# Patient Record
Sex: MALE | Race: WHITE | NOT HISPANIC OR LATINO | Employment: FULL TIME | URBAN - METROPOLITAN AREA
[De-identification: names, ages, dates, MRNs, and addresses within clinical notes are randomized per-mention and may not be internally consistent; named-entity substitution may affect disease eponyms.]

---

## 2017-02-17 ENCOUNTER — OFFICE VISIT (OUTPATIENT)
Dept: FAMILY MEDICINE | Facility: CLINIC | Age: 47
End: 2017-02-17
Payer: COMMERCIAL

## 2017-02-17 VITALS
WEIGHT: 231.5 LBS | DIASTOLIC BLOOD PRESSURE: 90 MMHG | SYSTOLIC BLOOD PRESSURE: 130 MMHG | OXYGEN SATURATION: 97 % | BODY MASS INDEX: 33.14 KG/M2 | HEIGHT: 70 IN | HEART RATE: 68 BPM

## 2017-02-17 DIAGNOSIS — R22.1 NECK MASS: Primary | ICD-10-CM

## 2017-02-17 PROCEDURE — 99213 OFFICE O/P EST LOW 20 MIN: CPT | Mod: S$GLB,,, | Performed by: FAMILY MEDICINE

## 2017-02-17 PROCEDURE — 1160F RVW MEDS BY RX/DR IN RCRD: CPT | Mod: S$GLB,,, | Performed by: FAMILY MEDICINE

## 2017-02-17 PROCEDURE — 99999 PR PBB SHADOW E&M-EST. PATIENT-LVL III: CPT | Mod: PBBFAC,,, | Performed by: FAMILY MEDICINE

## 2017-02-17 RX ORDER — PROMETHAZINE HYDROCHLORIDE AND CODEINE PHOSPHATE 6.25; 1 MG/5ML; MG/5ML
SOLUTION ORAL
Refills: 0 | COMMUNITY
Start: 2017-01-04 | End: 2018-02-20

## 2017-02-17 NOTE — PROGRESS NOTES
"Subjective:       Patient ID: Uday Olea is a 47 y.o. male.    Chief Complaint: Lump on side of Neck (left)    HPI Comments: Patient presents for pain on his neck. He states when he eats sour foods it feels tender. He states he noticed this around a few weeks ago. It hasn't gotten any bigger. He denies redness or drainage. He has no cold symptoms currently, but hasd some a week ago of sinus drip, rhinorrhea. He has had no fever or chills.     Review of Systems   Constitutional: Negative for activity change, appetite change, chills, fatigue and fever.   HENT: Positive for postnasal drip and rhinorrhea. Negative for congestion, sneezing and sore throat.    Respiratory: Negative for cough and shortness of breath.    Cardiovascular: Negative for chest pain.       Objective:       Vitals:    02/17/17 0906   BP: (!) 130/90   Pulse: 68   SpO2: 97%   Weight: 105 kg (231 lb 7.7 oz)   Height: 5' 10" (1.778 m)       Physical Exam   Constitutional: He is oriented to person, place, and time. He appears well-developed and well-nourished. No distress.   HENT:   Head: Normocephalic and atraumatic.   Neck: Normal range of motion. Neck supple.       Musculoskeletal: He exhibits no edema.   Neurological: He is alert and oriented to person, place, and time.   Skin: He is not diaphoretic.   Psychiatric: He has a normal mood and affect.       Assessment:       1. Neck mass        Plan:       Uday was seen today for lump on side of neck (left).    Diagnoses and all orders for this visit:    Neck mass  -     CT Soft Tissue Neck W WO Contrast; Future  -     CT Soft Tissue Neck W WO Contrast    will order ct of the neck at and outside facility to see the nature of this lymph node due to the size of the node.    CT RESULTS RECEIVED AND NOTED TO BE A NORMAL NODE.        "

## 2017-02-24 ENCOUNTER — TELEPHONE (OUTPATIENT)
Dept: FAMILY MEDICINE | Facility: CLINIC | Age: 47
End: 2017-02-24

## 2017-02-24 NOTE — TELEPHONE ENCOUNTER
RECEIVED CT OF NECK AND SHOWED AN ENLARGED LYMPH NODE THAT DID NOT LOOK PATHOLOGIC AND APPEARED BENIGN=NOT CANCER

## 2018-02-20 ENCOUNTER — OFFICE VISIT (OUTPATIENT)
Dept: FAMILY MEDICINE | Facility: CLINIC | Age: 48
End: 2018-02-20
Payer: COMMERCIAL

## 2018-02-20 VITALS
HEIGHT: 70 IN | OXYGEN SATURATION: 96 % | SYSTOLIC BLOOD PRESSURE: 130 MMHG | WEIGHT: 229.5 LBS | TEMPERATURE: 98 F | HEART RATE: 70 BPM | BODY MASS INDEX: 32.86 KG/M2 | DIASTOLIC BLOOD PRESSURE: 100 MMHG

## 2018-02-20 DIAGNOSIS — Z00.00 ANNUAL PHYSICAL EXAM: Primary | ICD-10-CM

## 2018-02-20 DIAGNOSIS — I10 ESSENTIAL HYPERTENSION: ICD-10-CM

## 2018-02-20 DIAGNOSIS — K21.9 GASTROESOPHAGEAL REFLUX DISEASE WITHOUT ESOPHAGITIS: ICD-10-CM

## 2018-02-20 PROCEDURE — 99396 PREV VISIT EST AGE 40-64: CPT | Mod: S$GLB,,, | Performed by: FAMILY MEDICINE

## 2018-02-20 PROCEDURE — 99999 PR PBB SHADOW E&M-EST. PATIENT-LVL III: CPT | Mod: PBBFAC,,, | Performed by: FAMILY MEDICINE

## 2018-02-20 RX ORDER — OMEPRAZOLE 40 MG/1
40 CAPSULE, DELAYED RELEASE ORAL DAILY
Qty: 90 CAPSULE | Refills: 3 | Status: SHIPPED | OUTPATIENT
Start: 2018-02-20 | End: 2018-02-26 | Stop reason: SDUPTHER

## 2018-02-20 RX ORDER — DEXAMETHASONE 0.75 MG/1
TABLET ORAL
COMMUNITY
Start: 2018-02-10 | End: 2018-02-20

## 2018-02-20 RX ORDER — HYDROCHLOROTHIAZIDE 12.5 MG/1
12.5 TABLET ORAL DAILY
Qty: 90 TABLET | Refills: 1 | Status: SHIPPED | OUTPATIENT
Start: 2018-02-20 | End: 2018-02-26 | Stop reason: SDUPTHER

## 2018-02-20 RX ORDER — HYDROCHLOROTHIAZIDE 12.5 MG/1
12.5 TABLET ORAL DAILY
Qty: 30 TABLET | Refills: 0 | Status: SHIPPED | OUTPATIENT
Start: 2018-02-20 | End: 2018-02-20 | Stop reason: SDUPTHER

## 2018-02-20 NOTE — PROGRESS NOTES
"Subjective:       Patient ID: Uday Olea is a 48 y.o. male.    Chief Complaint: Follow-up and Medication Refill    Patient is here for annual exam. He also needs refills of his omeprazole. His blood pressure is elevated today, but is stressed due to recently being laid off. He and his wife have started exercising. He is on omeprazole for his reflux. He doesn't take this all the time. He states he ran out and took 20 mg OTC Prilosec, but was having breakthrough symptoms. He has symptoms with red sauces, spicy foods, or with eating lat.       Review of Systems   Constitutional: Negative for activity change and unexpected weight change.   HENT: Negative for hearing loss, rhinorrhea and trouble swallowing.    Eyes: Negative for discharge and visual disturbance.   Respiratory: Negative for chest tightness, shortness of breath and wheezing.    Cardiovascular: Negative for chest pain, palpitations and leg swelling.   Gastrointestinal: Positive for blood in stool. Negative for abdominal pain, constipation, diarrhea and vomiting.        +reflux   Endocrine: Negative for polydipsia and polyuria.   Genitourinary: Negative for difficulty urinating, hematuria and urgency.   Musculoskeletal: Negative for arthralgias, joint swelling and neck pain.   Neurological: Positive for headaches. Negative for dizziness, syncope, weakness and light-headedness.   Psychiatric/Behavioral: Negative for confusion and dysphoric mood.       Objective:       Vitals:    02/20/18 1446   BP: (!) 130/100   Pulse: 70   Temp: 98.1 °F (36.7 °C)   TempSrc: Oral   SpO2: 96%   Weight: 104.1 kg (229 lb 8 oz)   Height: 5' 10" (1.778 m)       Physical Exam   Constitutional: He appears well-developed and well-nourished. No distress.   HENT:   Head: Normocephalic.   Right Ear: External ear normal.   Left Ear: External ear normal.   Mouth/Throat: Oropharynx is clear and moist. No oropharyngeal exudate.   Eyes: Conjunctivae are normal.   Neck: Normal range of " motion. Neck supple. No thyromegaly present.   Cardiovascular: Normal rate, regular rhythm and normal heart sounds.  Exam reveals no gallop and no friction rub.    No murmur heard.  Pulmonary/Chest: Effort normal and breath sounds normal. No respiratory distress. He has no wheezes. He has no rales.   Abdominal: Soft. Bowel sounds are normal. He exhibits no distension and no mass. There is no tenderness. There is no rebound and no guarding.   Musculoskeletal: He exhibits no edema.   Lymphadenopathy:     He has no cervical adenopathy.   Neurological: He is alert.   Skin: No rash noted. He is not diaphoretic.   Psychiatric: He has a normal mood and affect.       Assessment:       1. Annual physical exam    2. Gastroesophageal reflux disease without esophagitis    3. Essential hypertension        Plan:       Uday was seen today for follow-up and medication refill.    Diagnoses and all orders for this visit:    Annual physical exam  -     CBC auto differential; Future  -     Lipid panel; Future  -     Comprehensive metabolic panel; Future    Gastroesophageal reflux disease without esophagitis  -     omeprazole (PRILOSEC) 40 MG capsule; Take 1 capsule (40 mg total) by mouth once daily.  Stable. Refilled meds.     Essential hypertension  -     hydroCHLOROthiazide (HYDRODIURIL) 12.5 MG Tab; Take 1 tablet (12.5 mg total) by mouth once daily.

## 2018-02-26 DIAGNOSIS — K21.9 GASTROESOPHAGEAL REFLUX DISEASE WITHOUT ESOPHAGITIS: ICD-10-CM

## 2018-02-26 NOTE — TELEPHONE ENCOUNTER
----- Message from Brenda Rubin sent at 2/26/2018 10:45 AM CST -----  Contact: Self   Patient says he gave you the wrong pharmacy. Please send to the correct Pharmacy. Patient can be reached at  [phone].        hydroCHLOROthiazide (HYDRODIURIL) 12.5 MG Tab  omeprazole (PRILOSEC) 40 MG capsule    [facility]

## 2018-02-27 ENCOUNTER — LAB VISIT (OUTPATIENT)
Dept: LAB | Facility: HOSPITAL | Age: 48
End: 2018-02-27
Attending: FAMILY MEDICINE
Payer: COMMERCIAL

## 2018-02-27 DIAGNOSIS — Z00.00 ANNUAL PHYSICAL EXAM: ICD-10-CM

## 2018-02-27 LAB
ALBUMIN SERPL BCP-MCNC: 3.8 G/DL
ALP SERPL-CCNC: 51 U/L
ALT SERPL W/O P-5'-P-CCNC: 30 U/L
ANION GAP SERPL CALC-SCNC: 8 MMOL/L
AST SERPL-CCNC: 22 U/L
BASOPHILS # BLD AUTO: 0.05 K/UL
BASOPHILS NFR BLD: 0.9 %
BILIRUB SERPL-MCNC: 0.8 MG/DL
BUN SERPL-MCNC: 15 MG/DL
CALCIUM SERPL-MCNC: 9.4 MG/DL
CHLORIDE SERPL-SCNC: 106 MMOL/L
CHOLEST SERPL-MCNC: 148 MG/DL
CHOLEST/HDLC SERPL: 4 {RATIO}
CO2 SERPL-SCNC: 27 MMOL/L
CREAT SERPL-MCNC: 1.1 MG/DL
DIFFERENTIAL METHOD: NORMAL
EOSINOPHIL # BLD AUTO: 0.2 K/UL
EOSINOPHIL NFR BLD: 3.6 %
ERYTHROCYTE [DISTWIDTH] IN BLOOD BY AUTOMATED COUNT: 12.6 %
EST. GFR  (AFRICAN AMERICAN): >60 ML/MIN/1.73 M^2
EST. GFR  (NON AFRICAN AMERICAN): >60 ML/MIN/1.73 M^2
GLUCOSE SERPL-MCNC: 93 MG/DL
HCT VFR BLD AUTO: 45.4 %
HDLC SERPL-MCNC: 37 MG/DL
HDLC SERPL: 25 %
HGB BLD-MCNC: 15.1 G/DL
IMM GRANULOCYTES # BLD AUTO: 0.01 K/UL
IMM GRANULOCYTES NFR BLD AUTO: 0.2 %
LDLC SERPL CALC-MCNC: 80.2 MG/DL
LYMPHOCYTES # BLD AUTO: 1.8 K/UL
LYMPHOCYTES NFR BLD: 31.8 %
MCH RBC QN AUTO: 28.9 PG
MCHC RBC AUTO-ENTMCNC: 33.3 G/DL
MCV RBC AUTO: 87 FL
MONOCYTES # BLD AUTO: 0.6 K/UL
MONOCYTES NFR BLD: 11 %
NEUTROPHILS # BLD AUTO: 2.9 K/UL
NEUTROPHILS NFR BLD: 52.5 %
NONHDLC SERPL-MCNC: 111 MG/DL
NRBC BLD-RTO: 0 /100 WBC
PLATELET # BLD AUTO: 276 K/UL
PMV BLD AUTO: 9.7 FL
POTASSIUM SERPL-SCNC: 3.9 MMOL/L
PROT SERPL-MCNC: 7 G/DL
RBC # BLD AUTO: 5.22 M/UL
SODIUM SERPL-SCNC: 141 MMOL/L
TRIGL SERPL-MCNC: 154 MG/DL
WBC # BLD AUTO: 5.53 K/UL

## 2018-02-27 PROCEDURE — 36415 COLL VENOUS BLD VENIPUNCTURE: CPT | Mod: PO

## 2018-02-27 PROCEDURE — 80053 COMPREHEN METABOLIC PANEL: CPT

## 2018-02-27 PROCEDURE — 85025 COMPLETE CBC W/AUTO DIFF WBC: CPT

## 2018-02-27 PROCEDURE — 80061 LIPID PANEL: CPT

## 2018-02-27 RX ORDER — HYDROCHLOROTHIAZIDE 12.5 MG/1
12.5 TABLET ORAL DAILY
Qty: 90 TABLET | Refills: 1 | Status: SHIPPED | OUTPATIENT
Start: 2018-02-27 | End: 2019-02-27

## 2018-02-27 RX ORDER — OMEPRAZOLE 40 MG/1
40 CAPSULE, DELAYED RELEASE ORAL DAILY
Qty: 90 CAPSULE | Refills: 3 | Status: SHIPPED | OUTPATIENT
Start: 2018-02-27

## 2018-03-20 RX ORDER — HYDROCHLOROTHIAZIDE 12.5 MG/1
TABLET ORAL
Qty: 30 TABLET | Refills: 5 | Status: SHIPPED | OUTPATIENT
Start: 2018-03-20

## 2018-06-27 ENCOUNTER — TELEPHONE (OUTPATIENT)
Dept: FAMILY MEDICINE | Facility: CLINIC | Age: 48
End: 2018-06-27

## 2018-06-27 NOTE — TELEPHONE ENCOUNTER
----- Message from Thomas Quiñones sent at 6/27/2018  9:48 AM CDT -----  Contact: Wife-Riya   States pt was seen at [facility] this past weekend and wants to know if summary of visit has been faxed to clinic. Wife can be reached @ [phone].

## 2018-06-27 NOTE — TELEPHONE ENCOUNTER
Checked all faxes that has come through from Monday to now, and have not seen anything/ Will fax over release to [facility] to see if we can retreive it by the time of patient's office visit tomorrow

## 2018-06-28 ENCOUNTER — OFFICE VISIT (OUTPATIENT)
Dept: FAMILY MEDICINE | Facility: CLINIC | Age: 48
End: 2018-06-28
Payer: COMMERCIAL

## 2018-06-28 VITALS
BODY MASS INDEX: 32.79 KG/M2 | HEART RATE: 64 BPM | WEIGHT: 229.06 LBS | SYSTOLIC BLOOD PRESSURE: 138 MMHG | TEMPERATURE: 98 F | OXYGEN SATURATION: 97 % | DIASTOLIC BLOOD PRESSURE: 80 MMHG | HEIGHT: 70 IN

## 2018-06-28 DIAGNOSIS — K92.1 HEMATOCHEZIA: Primary | ICD-10-CM

## 2018-06-28 DIAGNOSIS — G62.9 NEUROPATHY: ICD-10-CM

## 2018-06-28 PROCEDURE — 3075F SYST BP GE 130 - 139MM HG: CPT | Mod: CPTII,S$GLB,, | Performed by: FAMILY MEDICINE

## 2018-06-28 PROCEDURE — 3008F BODY MASS INDEX DOCD: CPT | Mod: CPTII,S$GLB,, | Performed by: FAMILY MEDICINE

## 2018-06-28 PROCEDURE — 3079F DIAST BP 80-89 MM HG: CPT | Mod: CPTII,S$GLB,, | Performed by: FAMILY MEDICINE

## 2018-06-28 PROCEDURE — 99214 OFFICE O/P EST MOD 30 MIN: CPT | Mod: S$GLB,,, | Performed by: FAMILY MEDICINE

## 2018-06-28 PROCEDURE — 99999 PR PBB SHADOW E&M-EST. PATIENT-LVL III: CPT | Mod: PBBFAC,,, | Performed by: FAMILY MEDICINE

## 2018-06-28 NOTE — PROGRESS NOTES
Subjective:       Patient ID: Uday Olea is a 48 y.o. male.    Chief Complaint: ER follow Up (Numbness/Tingling in L arm. Went to  ER); Other (Feeling fine. Still having Tingling); and Hemorrhoids (blood in stool. Abd discomfort)    48 year old male with GERD presents with 2 issues:    He also has had some hematochezia. He had some increased bowel movements and has more blood with bowel movements in the toilet and on the paper.     2 week history of left arm numbness and tingling that improved with movement.He denies weakness in the extremity. He states it improved with moving his shoulder and is also brought on by changing positions. He states he was doing yard work and the numbness came back. He went to the ED at [place]. He had an echo, carotid ultrasound, MRI, and CT scan done. He was kept and all the results were normal, except the ECHO, which had not been received yet. He has his test results , which shows spurring in his cervical spine xray. His MRI AND CT OF THE BRAIN WERE NORMAL. ECHO showed mild LV hypertrophy and mild aortic dilatation. He had a normal CT of his neck.    He recently changed bed as it was so soft that he couldn't get out of bed.       Review of Systems   Constitutional: Negative for activity change and unexpected weight change.   HENT: Negative for hearing loss, rhinorrhea and trouble swallowing.    Eyes: Negative for discharge and visual disturbance.   Respiratory: Negative for chest tightness and wheezing.    Cardiovascular: Negative for chest pain and palpitations.   Gastrointestinal: Positive for blood in stool. Negative for constipation, diarrhea and vomiting.   Endocrine: Negative for polydipsia and polyuria.   Genitourinary: Negative for difficulty urinating, hematuria and urgency.   Musculoskeletal: Negative for arthralgias, joint swelling and neck pain.   Neurological: Negative for weakness and headaches.   Psychiatric/Behavioral: Negative for confusion and  "dysphoric mood.       Objective:       Vitals:    06/28/18 0807   BP: 138/80   Pulse: 64   Temp: 97.9 °F (36.6 °C)   TempSrc: Oral   SpO2: 97%   Weight: 103.9 kg (229 lb 0.9 oz)   Height: 5' 10" (1.778 m)       Physical Exam   Constitutional: He is oriented to person, place, and time. He appears well-developed and well-nourished. No distress.   HENT:   Head: Normocephalic and atraumatic.   Eyes: Conjunctivae are normal.   Neck: Normal range of motion. Neck supple. Carotid bruit is not present.   Cardiovascular: Normal rate, regular rhythm and normal heart sounds.  Exam reveals no gallop and no friction rub.    No murmur heard.  Pulmonary/Chest: Effort normal and breath sounds normal. No respiratory distress. He has no wheezes. He has no rales.   Musculoskeletal: He exhibits no edema.        Left shoulder: He exhibits normal range of motion, no tenderness, no bony tenderness, no deformity, no laceration, no pain and normal strength.        Cervical back: He exhibits tenderness and pain. He exhibits normal range of motion, no bony tenderness and no spasm.   Lymphadenopathy:     He has no cervical adenopathy.   Neurological: He is alert and oriented to person, place, and time.   Skin: He is not diaphoretic.   Psychiatric: He has a normal mood and affect.       Assessment:       1. Hematochezia    2. Neuropathy        Plan:       Uday was seen today for er follow up, other and hemorrhoids.    Diagnoses and all orders for this visit:    Hematochezia  -     Ambulatory referral to Gastroenterology  GI referral for colonoscopy    Neuropathy  -     Ambulatory Referral to Physical/Occupational Therapy  Referral for PT given. He will also consider seeing a chiropractor.            "

## 2018-07-03 ENCOUNTER — TELEPHONE (OUTPATIENT)
Dept: FAMILY MEDICINE | Facility: CLINIC | Age: 48
End: 2018-07-03

## 2018-07-03 NOTE — TELEPHONE ENCOUNTER
I spoke with the patient regarding a referral to Gastroenterology, patient will be scheduling with [facility].

## 2018-07-18 ENCOUNTER — CLINICAL SUPPORT (OUTPATIENT)
Dept: REHABILITATION | Facility: HOSPITAL | Age: 48
End: 2018-07-18
Attending: FAMILY MEDICINE
Payer: COMMERCIAL

## 2018-07-18 DIAGNOSIS — R29.898 DECREASED RANGE OF MOTION OF NECK: ICD-10-CM

## 2018-07-18 DIAGNOSIS — M99.01 CERVICAL SEGMENT DYSFUNCTION: ICD-10-CM

## 2018-07-18 DIAGNOSIS — R29.898 TIGHTNESS OF NECK: ICD-10-CM

## 2018-07-18 DIAGNOSIS — G62.9 NEUROPATHY: Primary | ICD-10-CM

## 2018-07-18 PROCEDURE — 97161 PT EVAL LOW COMPLEX 20 MIN: CPT | Mod: PN | Performed by: PHYSICAL THERAPIST

## 2018-07-18 NOTE — PROGRESS NOTES
Physical Therapy Initial Evaluation     Name: Uday Olea  [facility] Number: [id number]    Diagnosis:   Encounter Diagnoses   Name Primary?    Neuropathy Yes    Decreased range of motion of neck     Tightness of neck     Cervical segment dysfunction      Physician: Jigna Draper MD  Treatment Orders: PT Eval and Treat  No past medical history on file.  Current Outpatient Prescriptions   Medication Sig    hydroCHLOROthiazide (HYDRODIURIL) 12.5 MG Tab Take 1 tablet (12.5 mg total) by mouth once daily.    hydroCHLOROthiazide (HYDRODIURIL) 12.5 MG Tab TAKE 1 TABLET(12.5 MG) BY MOUTH EVERY DAY    omeprazole (PRILOSEC) 40 MG capsule Take 1 capsule (40 mg total) by mouth once daily.     No current facility-administered medications for this visit.      Review of patient's allergies indicates:  No Known Allergies      Start Time:  1115  Stop Time:  1215  Total Timed Minutes:  60          Subjective       Patient states:  Tingling / numbness LUE past elbow and at times into the hand, intermittent.  No loss of strength.  No pain described.   Onset: three months ago insidious onset while sitting on a bar stool working on and I-pad at a bar (high table). Began to notice tingling in the left arm from the shoulder to the elbow. This resolved but began to repeat itself on an intermittent basis. Three weeks ago working in the yard episode recurred but did not resolve. To ER for work-up. Cardiac testing - negative. X-Rays - positive for cervical spine osteophytes.   Radicular symptoms:  Yes LUE   Aggravating factors:   Arm positioned out to the side, elevated.  Easing factors:  Self-limits   Sleep - not disturbed  Dizziness, blurred vision, tinnitus - negative   Headaches - denies  Nausea - denies  Difficulty swallowing - denies     Pain Scale: Patient rates pain on a scale of 0-10 to be = THIS IS NOT DESCRIBED AS PAIN< only PARESTHESIA LUE   2 currently   3 at worst ;    0 at best .    Prior Therapy:  No     Functional Deficits Leading to Referral: there are not any functional limitations described   Personal - no limitations    Domestic - no limitations   Community / Social - no limitations   Occupation - NA   Prior functional status: no limitation     Occupation:  Not employed at this time                     Pts goals:  To eliminate and reduce the numbness.   Past History:  Negative for past history of neck or shoulder problems. Denies other musculoskeletal conditions for which he has been evaluated.   Imaging : none obtained    Objective     Posture Alignment: slight forward head, rounded shoulders:  prominent AC joint grade 3   Palpation: negative for cervical PSM spasm or guarding, pain in the left scalene with  MTrP in the left suprascapular area.  Sensation: Light Touch: Intact    Range of Motion/Strength:     Cervical/Thoracic AROM: Pain/Dysfunction with Movement:   Flexion                               70 DN   Extension                           65 DP   Right side bending                     40 No pain    Left side bending                        50 No pain   Right rotation DN   Left rotation DN     SEGMENTAL MOBILITY: pain elicited over low cervical spinous processes C5, C6    UPPER EXTREMITY STRENGTH:   Left  5/5 Right  5/5/         DTR's:   Left Right   Biceps Tendon 1+ 2+   Brachioradialis 1+ 1+   Triceps Tendon 2+ 2+       Special Tests:   Left Right   Compression Neg  Neg    Dystraction Neg  Neg    Spurling Pos   Neg          Selective Functional Movement Assessment:  FN: functional, non-painful  FP: functional, painful  DP: dysfunctional, painful  DN: dysfunctional, non-painful    Active Cervical Flexion: see above   Active Cervical Extension: see above   Cervical Rotation:  Right:see above   Left: see above   Upper extremity pattern 1:  Right: FN  Left:FN  Upper extremity pattern 2:  Right:FN   Left:FN    Cervical Break out patterns   Supine   Flex   -Active -  FN     Rotation   -Active -   R - FN  L - FN      OA   -active  R - FN   L - FN     C1C2  -Active-     R - FN  L - FN       PT Evaluation Completed? Yes  Discussed Plan of Care with patient: Yes    Pt/family was provided educational information, including: role of PT, goals for PT, scheduling - pt verbalized understanding.       Assessment       Initial Assessment (Pertinent finding, problem list and factors affecting outcome): The patient is referred with left UE paresthesia and no c/o cervical pain. Clinical findings demonstrate a stability and motor control dysfunction of the cervical flexors and  and a possible cervical spine joint mobility dysfunction and tissue extensibility dysfunction in extension. No neurological deficits ae identified.  Pt presents with signs and symptoms consistent with referring diagnosis. Evaluation has determined a decrease in functional status and subjective and objective deficits that can be addressed by physical therapy intervention. Pt does not demonstrate pain limiting functional activities. Decreased flexibility and strength limiting normal movement patterns. Possible decreased segmental motion. Decreased postural strength and awareness. Positive special testing.  Subjective and objective measures are addressed by goals in the plan of care. Patient/family are involved in the development of these goals. Uday Crow Lefty should benefit from therapeutic intervention to treat the symptoms and achieve established goals.Realistic expectations and hopeful outcomes were discussed. The patient demonstrated and verbalized an understanding of the program and goals as well as expectations. Patient has no barriers to learning. Patient verbalizes understanding of her program and goals. No cultural, spiritual, or educational needs identified.        History  Co-morbidities and personal factors that may impact the plan of care Examination  Body Structures and Functions, activity limitations and  participation restrictions that may impact the plan of care Clinical Presentation   Decision Making/ Complexity Score   Co-morbidities:  No limitations .              Personal Factors:   None identified  Body Regions: neck    Body Systems: musculoskeletal          Activity limitations: Arm positioned out to the side, elevated      Participation Restrictions:   Functional Deficits Leading to Referral: there are not any functional limitations described   Personal - no limitations    Domestic - no limitations   Community / Social - no limitations   Occupation - NA   Prior functional status: no limitation          Stable and uncomplicated          Pain  THIS IS NOT DESCRIBED AS PAIN< only PARESTHESIA LUE   2 currently   3 at worst ;   0 at best .   Complexity: Low                   Short Term GOALS: 6 weeks. Pt agrees with goals set.  1. Decrease paresthesia episodes  20-40%  2. Increase mobility in the low cervical segments in extension   3. Reduce tightness in the anterior chest and suprascapular musculature   4. Patient demonstrates correct exercise technique and return demonstration for progression to [facility]      Long Term GOALS: 12 weeks. Pt agrees with goals set.  1. Patient demonstrates increased scapulothoracic strength and endurance to improve tolerance to functional activities.   2.Patient demonstrates independence with Postural Awareness and correction   3. Patient demonstrates independence with body mechanics.   4. Restore normal cervical mobility and eliminate paresthesia LUE     CMS Impairment/Limitation/Restriction for FOTO Lumbar Spine Survey  Status Limitation G-Code CMS Severity Modifier  Intake 94% 6% Current Status CI - At least 1 percent but less than 20 percent  Predicted 95% 5% Goal Status+ CI - At least 1 percent but less than 20 percent  D/C Status CI **only report if this is a one time visit  +INTAKE FUNCTIONAL STATUS SUMMARY (7/18/2018)  Patient: ALEX CRAWLEY ([id number]) Primary Body Part:  Lumbar Spine Initial DOS: 7/18/2018  Produced and © [id number] by  [employer]    Plan       Certification Period: 6/18/2018 to 9/18/2018  Recommended Treatment Plan: 2 times per week for 12 weeks: Manual Therapy, Patient Education and Therapeutic Exercise  Other Recommendations:    Pt may be seen by PTA as part of the rehabilitation team.         Therapist: Jose Miguel Luis PT

## 2018-07-19 PROBLEM — G62.9 NEUROPATHY: Status: ACTIVE | Noted: 2018-07-19

## 2018-07-19 PROBLEM — M99.01 CERVICAL SEGMENT DYSFUNCTION: Status: ACTIVE | Noted: 2018-07-19

## 2018-07-19 PROBLEM — R29.898 DECREASED RANGE OF MOTION OF NECK: Status: ACTIVE | Noted: 2018-07-19

## 2018-07-19 PROBLEM — R29.898 TIGHTNESS OF NECK: Status: ACTIVE | Noted: 2018-07-19

## 2018-07-19 NOTE — PLAN OF CARE
Physical Therapy Initial Evaluation     Name: Uday Olea  [facility] Number: [id number]    Diagnosis:   Encounter Diagnoses   Name Primary?    Neuropathy Yes    Decreased range of motion of neck     Tightness of neck     Cervical segment dysfunction      Physician: Jigna Draper MD  Treatment Orders: PT Eval and Treat  No past medical history on file.  Current Outpatient Prescriptions   Medication Sig    hydroCHLOROthiazide (HYDRODIURIL) 12.5 MG Tab Take 1 tablet (12.5 mg total) by mouth once daily.    hydroCHLOROthiazide (HYDRODIURIL) 12.5 MG Tab TAKE 1 TABLET(12.5 MG) BY MOUTH EVERY DAY    omeprazole (PRILOSEC) 40 MG capsule Take 1 capsule (40 mg total) by mouth once daily.     No current facility-administered medications for this visit.      Review of patient's allergies indicates:  No Known Allergies      Start Time:  1115  Stop Time:  1215  Total Timed Minutes:  60          Subjective       Patient states:  Tingling / numbness LUE past elbow and at times into the hand, intermittent.  No loss of strength.  No pain described.   Onset: three months ago insidious onset while sitting on a bar stool working on and I-pad at a bar (high table). Began to notice tingling in the left arm from the shoulder to the elbow. This resolved but began to repeat itself on an intermittent basis. Three weeks ago working in the yard episode recurred but did not resolve. To ER for work-up. Cardiac testing - negative. X-Rays - positive for cervical spine osteophytes.   Radicular symptoms:  Yes LUE   Aggravating factors:   Arm positioned out to the side, elevated.  Easing factors:  Self-limits   Sleep - not disturbed  Dizziness, blurred vision, tinnitus - negative   Headaches - denies  Nausea - denies  Difficulty swallowing - denies     Pain Scale: Patient rates pain on a scale of 0-10 to be = THIS IS NOT DESCRIBED AS PAIN< only PARESTHESIA LUE   2 currently   3 at worst ;    0 at best .    Prior Therapy:  No     Functional Deficits Leading to Referral: there are not any functional limitations described   Personal - no limitations    Domestic - no limitations   Community / Social - no limitations   Occupation - NA   Prior functional status: no limitation     Occupation:  Not employed at this time                     Pts goals:  To eliminate and reduce the numbness.   Past History:  Negative for past history of neck or shoulder problems. Denies other musculoskeletal conditions for which he has been evaluated.   Imaging : none obtained    Objective     Posture Alignment: slight forward head, rounded shoulders:  prominent AC joint grade 3   Palpation: negative for cervical PSM spasm or guarding, pain in the left scalene with  MTrP in the left suprascapular area.  Sensation: Light Touch: Intact    Range of Motion/Strength:     Cervical/Thoracic AROM: Pain/Dysfunction with Movement:   Flexion                               70 DN   Extension                           65 DP   Right side bending                     40 No pain    Left side bending                        50 No pain   Right rotation DN   Left rotation DN     SEGMENTAL MOBILITY: pain elicited over low cervical spinous processes C5, C6    UPPER EXTREMITY STRENGTH:   Left  5/5 Right  5/5/         DTR's:   Left Right   Biceps Tendon 1+ 2+   Brachioradialis 1+ 1+   Triceps Tendon 2+ 2+       Special Tests:   Left Right   Compression Neg  Neg    Dystraction Neg  Neg    Spurling Pos   Neg          Selective Functional Movement Assessment:  FN: functional, non-painful  FP: functional, painful  DP: dysfunctional, painful  DN: dysfunctional, non-painful    Active Cervical Flexion: see above   Active Cervical Extension: see above   Cervical Rotation:  Right:see above   Left: see above   Upper extremity pattern 1:  Right: FN  Left:FN  Upper extremity pattern 2:  Right:FN   Left:FN    Cervical Break out patterns   Supine   Flex   -Active -  FN     Rotation   -Active -   R - FN  L - FN      OA   -active  R - FN   L - FN     C1C2  -Active-     R - FN  L - FN       PT Evaluation Completed? Yes  Discussed Plan of Care with patient: Yes    Pt/family was provided educational information, including: role of PT, goals for PT, scheduling - pt verbalized understanding.       Assessment       Initial Assessment (Pertinent finding, problem list and factors affecting outcome): The patient is referred with left UE paresthesia and no c/o cervical pain. Clinical findings demonstrate a stability and motor control dysfunction of the cervical flexors and  and a possible cervical spine joint mobility dysfunction and tissue extensibility dysfunction in extension. No neurological deficits ae identified.  Pt presents with signs and symptoms consistent with referring diagnosis. Evaluation has determined a decrease in functional status and subjective and objective deficits that can be addressed by physical therapy intervention. Pt does not demonstrate pain limiting functional activities. Decreased flexibility and strength limiting normal movement patterns. Possible decreased segmental motion. Decreased postural strength and awareness. Positive special testing.  Subjective and objective measures are addressed by goals in the plan of care. Patient/family are involved in the development of these goals. Uday Crow Lefty should benefit from therapeutic intervention to treat the symptoms and achieve established goals.Realistic expectations and hopeful outcomes were discussed. The patient demonstrated and verbalized an understanding of the program and goals as well as expectations. Patient has no barriers to learning. Patient verbalizes understanding of her program and goals. No cultural, spiritual, or educational needs identified.        History  Co-morbidities and personal factors that may impact the plan of care Examination  Body Structures and Functions, activity limitations and  participation restrictions that may impact the plan of care Clinical Presentation   Decision Making/ Complexity Score   Co-morbidities:  No limitations .              Personal Factors:   None identified  Body Regions: neck    Body Systems: musculoskeletal          Activity limitations: Arm positioned out to the side, elevated      Participation Restrictions:   Functional Deficits Leading to Referral: there are not any functional limitations described   Personal - no limitations    Domestic - no limitations   Community / Social - no limitations   Occupation - NA   Prior functional status: no limitation          Stable and uncomplicated          Pain  THIS IS NOT DESCRIBED AS PAIN< only PARESTHESIA LUE   2 currently   3 at worst ;   0 at best .   Complexity: Low                   Short Term GOALS: 6 weeks. Pt agrees with goals set.  1. Decrease paresthesia episodes  20-40%  2. Increase mobility in the low cervical segments in extension   3. Reduce tightness in the anterior chest and suprascapular musculature   4. Patient demonstrates correct exercise technique and return demonstration for progression to [facility]      Long Term GOALS: 12 weeks. Pt agrees with goals set.  1. Patient demonstrates increased scapulothoracic strength and endurance to improve tolerance to functional activities.   2.Patient demonstrates independence with Postural Awareness and correction   3. Patient demonstrates independence with body mechanics.   4. Restore normal cervical mobility and eliminate paresthesia LUE     CMS Impairment/Limitation/Restriction for FOTO Lumbar Spine Survey  Status Limitation G-Code CMS Severity Modifier  Intake 94% 6% Current Status CI - At least 1 percent but less than 20 percent  Predicted 95% 5% Goal Status+ CI - At least 1 percent but less than 20 percent  D/C Status CI **only report if this is a one time visit  +INTAKE FUNCTIONAL STATUS SUMMARY (7/18/2018)  Patient: ALEX CRAWLEY ([id number]) Primary Body Part:  Lumbar Spine Initial DOS: 7/18/2018  Produced and © [id number] by  [employer]    Plan       Certification Period: 6/18/2018 to 9/18/2018  Recommended Treatment Plan: 2 times per week for 12 weeks: Manual Therapy, Patient Education and Therapeutic Exercise  Other Recommendations:    Pt may be seen by PTA as part of the rehabilitation team.         Therapist: Jose Miguel Luis PT

## 2018-07-31 ENCOUNTER — CLINICAL SUPPORT (OUTPATIENT)
Dept: REHABILITATION | Facility: HOSPITAL | Age: 48
End: 2018-07-31
Attending: FAMILY MEDICINE
Payer: COMMERCIAL

## 2018-07-31 DIAGNOSIS — R29.898 TIGHTNESS OF NECK: ICD-10-CM

## 2018-07-31 DIAGNOSIS — R29.898 DECREASED RANGE OF MOTION OF NECK: ICD-10-CM

## 2018-07-31 DIAGNOSIS — G62.9 NEUROPATHY: Primary | ICD-10-CM

## 2018-07-31 DIAGNOSIS — M99.01 CERVICAL SEGMENT DYSFUNCTION: ICD-10-CM

## 2018-07-31 PROCEDURE — 97140 MANUAL THERAPY 1/> REGIONS: CPT | Mod: PN | Performed by: PHYSICAL THERAPIST

## 2018-07-31 PROCEDURE — 97110 THERAPEUTIC EXERCISES: CPT | Mod: PN | Performed by: PHYSICAL THERAPIST

## 2018-07-31 NOTE — PROGRESS NOTES
"                                          Physical Therapy Daily Note           Name: Uday Olea  Clinic Number: [id number]  Date of Treatment: 07/31/2018   Diagnosis:   Encounter Diagnoses   Name Primary?    Neuropathy Yes    Decreased range of motion of neck     Tightness of neck     Cervical segment dysfunction        Time in: 1405  Time Out: 1510  Total Treatment Time: 65    VISITS / GILL: 2/20 12/31/2018  BOLD=INDICATES ACTIVITIES PERFORMED.      Subjective  Patient states "he has no pain today. He has tingling down his arm which has decreased in frequency the past 2 weeks to where he barely notices it but does not know what has changed."  Pain level - 0/10     Objective    Treatment: Patient  was instructed in and performed therapeutic exercises to develop strength, ROM, flexibility and posture. Patient performed therapeutic exercises consisting of the following      Leg press   Recumbent bike  Upright bike   UE ergometer  Treadmill   Elliptical       THERAPEUTIC EXERCISE 55'   SUPINE  -Chin tucks x20  -rotation R/L w/end range OP x15   -lateral SB stretch 10" x 6   -TD, angels , scarecrow x10 patterns .   -cervical isometrics 7" x 10     SIDELYING  -lateral lift   -    PRONE  -neck exten x15    STANDING.  -hi row, tight row, w-pull 10 patterns     SITTING         MANUAL THERAPY: manual distraction 7' at the cervical spine with the Mulligan Belt.   MODALITY  DIRECT EDUCATION:        Assessment    Completed all activities as noted. He demonstrated good mobility and was not limited with any of the cervical stretching, ROM or isometric exercises. There was no pain exhibited. Should progress well.   Medical Necessity is demonstrated by:   difficulty  to participate in daily activities  Pain limits function of effected part for some activities, Requires skilled supervision to complete and progress treatment interventions and HEP.  Pt demo good understanding of the education provided. Patient demonstrated " good return demonstration of activities.Patient's tolerance to treatment was good. Patient will continue to benefit from skilled PTintervention.Patient is making progress towards established goals.  New/Revised goals: no  Continue with established Plan of Care towards PT goals.       PLAN     Certification Period: 6/18/2018 to 9/18/2018  Recommended Treatment Plan: 2 times per week for 12 weeks: Manual Therapy, Patient Education and Therapeutic Exercise  Other Recommendations:    Pt may be seen by PTA as part of the rehabilitation team.      Therapist: Jose Miguel Luis PT

## 2018-08-02 ENCOUNTER — CLINICAL SUPPORT (OUTPATIENT)
Dept: REHABILITATION | Facility: HOSPITAL | Age: 48
End: 2018-08-02
Attending: FAMILY MEDICINE
Payer: COMMERCIAL

## 2018-08-02 DIAGNOSIS — G62.9 NEUROPATHY: Primary | ICD-10-CM

## 2018-08-02 DIAGNOSIS — R29.898 DECREASED RANGE OF MOTION OF NECK: ICD-10-CM

## 2018-08-02 DIAGNOSIS — M99.01 CERVICAL SEGMENT DYSFUNCTION: ICD-10-CM

## 2018-08-02 DIAGNOSIS — R29.898 TIGHTNESS OF NECK: ICD-10-CM

## 2018-08-02 PROCEDURE — 97110 THERAPEUTIC EXERCISES: CPT | Mod: PN | Performed by: PHYSICAL THERAPIST

## 2018-08-02 NOTE — PROGRESS NOTES
"                                          Physical Therapy Daily Note           Name: Uday Olea  Clinic Number: [id number]  Date of Treatment: 08/02/2018   Diagnosis:   Encounter Diagnoses   Name Primary?    Neuropathy Yes    Decreased range of motion of neck     Tightness of neck     Cervical segment dysfunction        Time in: 905  Time Out:1015   Total Treatment Time: 70 minutes     VISITS / GILL: 3/20  12/31/2018  BOLD=INDICATES ACTIVITIES PERFORMED.      Subjective  Patient states "he is not experiencing any pain, numbness or tingling in the arm. There is some soreness in the neck following the last session.     Pain level - 0/10     Objective    Treatment: Patient  was instructed in and performed therapeutic exercises to develop strength, ROM, flexibility and posture. Patient performed therapeutic exercises consisting of the following      Leg press   Recumbent bike  Upright bike   UE ergometer 8'  Treadmill   Elliptical       THERAPEUTIC EXERCISE   SUPINE  -Chin tucks x20  -rotation R/L w/end range OP x15   -lateral SB stretch 10" x 6   -TD, angels , scarecrow x10 patterns .   -cervical isometrics 7" x 10   -thoracic stretch with arm raise / neck rotation   - rotation with overhead reach  SIDELYING  -lateral lift x15  -    PRONE  -neck exten x15    STANDING.  -hi row, tight row, w-pull 10 patterns     SITTING         MANUAL THERAPY: manual distraction 7' at the cervical spine with the Mulligan Belt.   MODALITY  DIRECT EDUCATION:        Assessment    All activities were performed. He did not demonstrate signs of pain. Good cervical mobility identified. Progressing with cervical strengthening.   Medical Necessity is demonstrated by:   difficulty  to participate in daily activities  Pain limits function of effected part for some activities, Requires skilled supervision to complete and progress treatment interventions and HEP.  Pt demo good understanding of the education provided. Patient demonstrated good " return demonstration of activities.Patient's tolerance to treatment was good. Patient will continue to benefit from skilled PTintervention.Patient is making progress towards established goals.  New/Revised goals: no  Continue with established Plan of Care towards PT goals.       PLAN     Certification Period: 6/18/2018 to 9/18/2018  Recommended Treatment Plan: 2 times per week for 12 weeks: Manual Therapy, Patient Education and Therapeutic Exercise  Other Recommendations:    Pt may be seen by PTA as part of the rehabilitation team.      Therapist: Jose Miguel Luis, PT

## 2018-08-06 ENCOUNTER — CLINICAL SUPPORT (OUTPATIENT)
Dept: REHABILITATION | Facility: HOSPITAL | Age: 48
End: 2018-08-06
Attending: FAMILY MEDICINE
Payer: COMMERCIAL

## 2018-08-06 DIAGNOSIS — R29.898 TIGHTNESS OF NECK: ICD-10-CM

## 2018-08-06 DIAGNOSIS — R29.898 DECREASED RANGE OF MOTION OF NECK: ICD-10-CM

## 2018-08-06 DIAGNOSIS — G62.9 NEUROPATHY: Primary | ICD-10-CM

## 2018-08-06 DIAGNOSIS — M99.01 CERVICAL SEGMENT DYSFUNCTION: ICD-10-CM

## 2018-08-06 PROCEDURE — 97110 THERAPEUTIC EXERCISES: CPT | Mod: PN | Performed by: PHYSICAL THERAPIST

## 2018-08-06 NOTE — PROGRESS NOTES
"                                          Physical Therapy Daily Note           Name: Uday lOea  Clinic Number: [id number]  Date of Treatment: 08/06/2018   Diagnosis:   Encounter Diagnoses   Name Primary?    Neuropathy Yes    Decreased range of motion of neck     Tightness of neck     Cervical segment dysfunction        Time in: 910  Time Out:1005  Total Treatment Time: 55 minutes     VISITS / GILL: 4/20 12/31/2018  BOLD=INDICATES ACTIVITIES PERFORMED.      Subjective  Patient states "he is not experiencing any pain, numbness or tingling in the arm today. Indicates he has not had tingling in the arm for about a week now.     Pain level - 0/10     Objective    Treatment: Patient  was instructed in and performed therapeutic exercises to develop strength, ROM, flexibility and posture. Patient performed therapeutic exercises consisting of the following      Leg press   Recumbent bike  Upright bike   UE ergometer 8'  Treadmill   Elliptical       THERAPEUTIC EXERCISE   SUPINE  -Chin tucks x20  -rotation R/L w/end range OP x15   -lateral SB stretch 10" x 6   -TD, angels , scarecrow x10 patterns x Orange TB  -cervical isometrics 7" x 10   -thoracic stretch with arm raise / neck rotation   - rotation with overhead reach x10     SIDELYING  -lateral lift x15    PRONE  -neck exten x15    STANDING.  -hi row, tight row, w-pull 10 patterns     SITTING         MANUAL THERAPY: manual distraction 7' at the cervical spine with the Mulligan Belt.   MODALITY  DIRECT EDUCATION:        Assessment    All activities completed as noted above.   Progressed strengthening with addition of orange TheraBand which patient tolerated well with no exacerbations of symptoms. Continue to progress strengthening.  Medical Necessity is demonstrated by:   difficulty  to participate in daily activities  Pain limits function of effected part for some activities, Requires skilled supervision to complete and progress treatment interventions and HEP.  " Pt demo good understanding of the education provided. Patient demonstrated good return demonstration of activities.Patient's tolerance to treatment was good. Patient will continue to benefit from skilled PTintervention.Patient is making progress towards established goals.  New/Revised goals: no  Continue with established Plan of Care towards PT goals.       PLAN     Certification Period: 6/18/2018 to 9/18/2018  Recommended Treatment Plan: 2 times per week for 12 weeks: Manual Therapy, Patient Education and Therapeutic Exercise  Other Recommendations:    Pt may be seen by PTA as part of the rehabilitation team.      Therapist: Jose Miguel Luis, PT

## 2018-08-10 ENCOUNTER — CLINICAL SUPPORT (OUTPATIENT)
Dept: REHABILITATION | Facility: HOSPITAL | Age: 48
End: 2018-08-10
Attending: FAMILY MEDICINE
Payer: COMMERCIAL

## 2018-08-10 DIAGNOSIS — G62.9 NEUROPATHY: Primary | ICD-10-CM

## 2018-08-10 DIAGNOSIS — M99.01 CERVICAL SEGMENT DYSFUNCTION: ICD-10-CM

## 2018-08-10 DIAGNOSIS — R29.898 TIGHTNESS OF NECK: ICD-10-CM

## 2018-08-10 DIAGNOSIS — R29.898 DECREASED RANGE OF MOTION OF NECK: ICD-10-CM

## 2018-08-10 PROCEDURE — 97110 THERAPEUTIC EXERCISES: CPT | Mod: PN | Performed by: PHYSICAL THERAPIST

## 2018-08-10 NOTE — PROGRESS NOTES
"                                          Physical Therapy Daily Note           Name: Uday Olea  Clinic Number: [id number]  Date of Treatment: 08/10/2018   Diagnosis:   Encounter Diagnoses   Name Primary?    Neuropathy Yes    Decreased range of motion of neck     Tightness of neck     Cervical segment dysfunction        Time in: 910  Time Out:1020  Total Treatment Time: minutes 70    VISITS / GILL: 5/20 12/31/2018  BOLD=INDICATES ACTIVITIES PERFORMED.      Subjective  Patient states " he is  Only experiencing stiffness in the neck but no pain. Does not report any tingling or numbness in the LUE.       Pain level - 0/10     Objective    Treatment: Patient  was instructed in and performed therapeutic exercises to develop strength, ROM, flexibility and posture. Patient performed therapeutic exercises consisting of the following      Leg press   Recumbent bike  Upright bike   UE ergometer 8'  Treadmill   Elliptical       THERAPEUTIC EXERCISE   SUPINE  -Chin tucks x20  -rotation R/L w/end range OP x15   -lateral SB stretch 10" x 6   -TD, angels , scarecrow x10 patterns x blue  TB  -cervical isometrics 7" x 10   -thoracic stretch with arm raise / neck rotation   - rotation with overhead reach x10     SIDELYING  -lateral lift x15    PRONE  -neck exten x15    STANDING.  -hi row, tight row, w-pull 10 patterns   - low trap activation with GTB 20x     SITTING         MANUAL THERAPY: manual distraction 7' at the cervical spine with the Mulligan Belt.   MODALITY  DIRECT EDUCATION:    Patient was issued HEP   Written Home Exercises Reviewed.   Pt demo good understanding of the education provided. Patient demonstrated good return demonstration of activities            Assessment  All activities completed as noted above. Progressed strengthening with addition of cervical resisted exercises with blue TheraBand which patient tolerated well with no exacerbations of symptoms.  He is asymptomatic and likely ready for DC after " the next session.   Medical Necessity is demonstrated by:   difficulty  to participate in daily activities  Pain limits function of effected part for some activities, Requires skilled supervision to complete and progress treatment interventions and HEP.  Pt demo good understanding of the education provided. Patient demonstrated good return demonstration of activities.Patient's tolerance to treatment was good. Patient will continue to benefit from skilled PTintervention.Patient is making progress towards established goals.  New/Revised goals: no  Continue with established Plan of Care towards PT goals.     FOTO visit #5   CMS Impairment/Limitation/Restriction for FOTO Lumbar Spine Survey  Status Limitation G-Code CMS Severity Modifier  Intake 94% 6%  Predicted 95% 5% Goal Status+ CI - At least 1 percent but less than 20 percent  8/10/2018 94% 6% Current Status CI - At least 1 percent but less than 20 percent  D/C Status CI **only report if this is discharge survey  +Based on FOTO predicted change score  * Mean, Risk Adjusted, Intake Composite FS measures from FOTO aggregate database.  ** As indicated by the ICF assignments to the survey items in the FOTO survey used.  [facility] [place]  FUNCTIONAL STATUS SUMMARY (7/18/2018)  Patient: ALEX CRAWLEY ([id number]) Primary Body Part: Lumbar Spine Initial DOS: 7/18/2018  Produced and    PLAN     Certification Period: 6/18/2018 to 9/18/2018  Recommended Treatment Plan: 2 times per week for 12 weeks: Manual Therapy, Patient Education and Therapeutic Exercise  Other Recommendations:    Pt may be seen by PTA as part of the rehabilitation team.      Student Physical Therapist: Karissa Barreto  Therapist: Jose Miguel Luis, PT  I certify that I was present in the room directing the student in service delivery and guiding them using my skilled judgment. As the co-signing therapist I have reviewed the students documentation and am  responsible for the treatment, assessment, and plan.

## 2018-08-14 ENCOUNTER — CLINICAL SUPPORT (OUTPATIENT)
Dept: REHABILITATION | Facility: HOSPITAL | Age: 48
End: 2018-08-14
Attending: FAMILY MEDICINE
Payer: COMMERCIAL

## 2018-08-14 DIAGNOSIS — R29.898 DECREASED RANGE OF MOTION OF NECK: ICD-10-CM

## 2018-08-14 DIAGNOSIS — G62.9 NEUROPATHY: Primary | ICD-10-CM

## 2018-08-14 DIAGNOSIS — M99.01 CERVICAL SEGMENT DYSFUNCTION: ICD-10-CM

## 2018-08-14 DIAGNOSIS — R29.898 TIGHTNESS OF NECK: ICD-10-CM

## 2018-08-14 PROCEDURE — 97110 THERAPEUTIC EXERCISES: CPT | Mod: PN | Performed by: PHYSICAL THERAPIST

## 2018-08-14 PROCEDURE — 97140 MANUAL THERAPY 1/> REGIONS: CPT | Mod: PN | Performed by: PHYSICAL THERAPIST

## 2018-08-14 NOTE — PROGRESS NOTES
"                                          Physical Therapy Daily Note           Name: Uday Olea  Clinic Number: [id number]  Date of Treatment: 08/14/2018   Diagnosis:   Encounter Diagnoses   Name Primary?    Neuropathy Yes    Decreased range of motion of neck     Tightness of neck     Cervical segment dysfunction        Time in: 905  Time Out:1005  Total Treatment Time: 60 minutes     VISITS / GILL: 6/20 12/31/2018  BOLD=INDICATES ACTIVITIES PERFORMED.      Subjective  Patient states " he is doing pretty good and only stiff.     Pain level - 0/10     Objective    Treatment: Patient  was instructed in and performed therapeutic exercises to develop strength, ROM, flexibility and posture. Patient performed therapeutic exercises consisting of the following      Leg press   Recumbent bike  Upright bike   UE ergometer 8'  Treadmill  10'   Elliptical       THERAPEUTIC EXERCISE 1-1 PT = 50'   SUPINE  -Chin tucks x20  -rotation R/L w/end range OP x15   -lateral SB stretch 10" x 6   -TD, angels , scarecrow x15 patterns x blue  TB  -cervical isometrics 7" x 10   -thoracic stretch with arm raise / neck rotation   - rotation with overhead reach x10     SIDELYING  -lateral lift x15    PRONE  -neck exten x15    STANDING.  -hi row, tight row, w-pull 10 patterns   - low trap activation with GTB 20x     SITTING         MANUAL THERAPY: manual distraction 7' at the cervical spine with the Mulligan Belt.   MODALITY  DIRECT EDUCATION:    Patient was issued HEP   Written Home Exercises Reviewed.   Pt demo good understanding of the education provided. Patient demonstrated good return demonstration of activities            Assessment    The patient completed all activities as noted. He did not encounter any difficulties. Progress has been without exacerbation. Noted resolution of LUE esthesia. Will look to DC by 10th visit if he remains asymptomatic.   Medical Necessity is demonstrated by:   difficulty  to participate in daily " activities  Pain limits function of effected part for some activities, Requires skilled supervision to complete and progress treatment interventions and HEP.  Pt demo good understanding of the education provided. Patient demonstrated good return demonstration of activities.Patient's tolerance to treatment was good. Patient will continue to benefit from skilled PTintervention.Patient is making progress towards established goals.  New/Revised goals: no  Continue with established Plan of Care towards PT goals.     FOTO visit #5   CMS Impairment/Limitation/Restriction for FOTO Lumbar Spine Survey  Status Limitation G-Code CMS Severity Modifier  Intake 94% 6%  Predicted 95% 5% Goal Status+ CI - At least 1 percent but less than 20 percent  8/10/2018 94% 6% Current Status CI - At least 1 percent but less than 20 percent  D/C Status CI **only report if this is discharge survey  +Based on FOTO predicted change score  * Mean, Risk Adjusted, Intake Composite FS measures from FOTO aggregate database.  ** As indicated by the ICF assignments to the survey items in the FOTO survey used.  [facility] [place]  FUNCTIONAL STATUS SUMMARY (7/18/2018)  Patient: ALEX CRAWLEY ([id number]) Primary Body Part: Lumbar Spine Initial DOS: 7/18/2018  Produced and    PLAN     Certification Period: 6/18/2018 to 9/18/2018  Recommended Treatment Plan: 2 times per week for 12 weeks: Manual Therapy, Patient Education and Therapeutic Exercise  Other Recommendations:    Pt may be seen by PTA as part of the rehabilitation team.      Student Physical Therapist: Karissa Barreto  Therapist: Jose Miguel Luis, PT  I certify that I was present in the room directing the student in service delivery and guiding them using my skilled judgment. As the co-signing therapist I have reviewed the students documentation and am responsible for the treatment, assessment, and plan.

## 2018-08-16 ENCOUNTER — CLINICAL SUPPORT (OUTPATIENT)
Dept: REHABILITATION | Facility: HOSPITAL | Age: 48
End: 2018-08-16
Attending: FAMILY MEDICINE
Payer: COMMERCIAL

## 2018-08-16 DIAGNOSIS — R29.898 TIGHTNESS OF NECK: ICD-10-CM

## 2018-08-16 DIAGNOSIS — R29.898 DECREASED RANGE OF MOTION OF NECK: ICD-10-CM

## 2018-08-16 DIAGNOSIS — M99.01 CERVICAL SEGMENT DYSFUNCTION: ICD-10-CM

## 2018-08-16 DIAGNOSIS — G62.9 NEUROPATHY: Primary | ICD-10-CM

## 2018-08-16 PROCEDURE — 97110 THERAPEUTIC EXERCISES: CPT | Mod: PN

## 2018-08-16 NOTE — PROGRESS NOTES
"                                          Physical Therapy Daily Note           Name: Uday Olea  Clinic Number: [id number]  Date of Treatment: 08/16/2018   Diagnosis:   Encounter Diagnoses   Name Primary?    Neuropathy Yes    Cervical segment dysfunction     Tightness of neck     Decreased range of motion of neck        Time in: 0905  Time Out:1005  Total Treatment Time: 60 minutes (1:1 with PTA for duration of treatment session)     VISITS / GILL: 7 of 20 / 12/31/2018  BOLD=INDICATES ACTIVITIES PERFORMED.      Subjective    Patient reports stiffness in his neck. Patient denies any pain.    Pain level - 0 out of 10, currently.     Objective    Treatment: Patient  was instructed in and performed therapeutic exercises to develop strength, ROM, flexibility and posture. Patient performed therapeutic exercises consisting of the following:      Leg press   Recumbent bike  Upright bike   UE ergometer x 8 minutes  Treadmill    Elliptical       THERAPEUTIC EXERCISE   SUPINE  Chin tucks x 20  Rotation R/L w/end range OP x 15   Lateral SB stretch 10" x 6   TD, angels, scarecrow x 15 patterns x Blue TB  Cervical isometrics 7" x 10   Thoracic stretch with arm raise / neck rotation   Rotation with overhead reach x 10     SIDELYING  Lateral lift x 15    PRONE  Neck exten x 15    STANDING.  Hi row, tight row, w-pull 10 patterns   Low trap activation with GTB 20x     SITTING     MANUAL THERAPY: Cervical distraction, suboccipital release x 8 minutes.  MODALITY  DIRECT EDUCATION: Patient was issued HEP including: chin tucks, cervical rotation, upper trap stretch, levator scap stretch, cervical isometric side bend and retraction. Pt demo good understanding of the education provided. Patient demonstrated good return demonstration of activities      Assessment    Patient tolerated treatment session well today. Good tolerance to exercises performed with no exacerbation of neck pain. Patient demonstrates good scapular activation " with standing rows requiring minimal verbal cueing for proper technique and muscle activation.     Medical Necessity is demonstrated by:   difficulty  to participate in daily activities  Pain limits function of effected part for some activities, Requires skilled supervision to complete and progress treatment interventions and HEP.  Pt demo good understanding of the education provided. Patient demonstrated good return demonstration of activities.Patient's tolerance to treatment was good. Patient will continue to benefit from skilled PTintervention.Patient is making progress towards established goals.  New/Revised goals: no  Continue with established Plan of Care towards PT goals.     FOTO visit #5   CMS Impairment/Limitation/Restriction for FOTO Lumbar Spine Survey  Status Limitation G-Code CMS Severity Modifier  Intake 94% 6%  Predicted 95% 5% Goal Status+ CI - At least 1 percent but less than 20 percent  8/10/2018 94% 6% Current Status CI - At least 1 percent but less than 20 percent  D/C Status CI **only report if this is discharge survey  +Based on FOTO predicted change score  * Mean, Risk Adjusted, Intake Composite FS measures from FOTO aggregate database.  ** As indicated by the ICF assignments to the survey items in the FOTO survey used.  [facility] [place]  FUNCTIONAL STATUS SUMMARY (7/18/2018)  Patient: ALEX CRAWLEY ([id number]) Primary Body Part: Lumbar Spine Initial DOS: 7/18/2018  Produced and    PLAN     Certification Period: 6/18/2018 to 9/18/2018  Recommended Treatment Plan: 2 times per week for 12 weeks: Manual Therapy, Patient Education and Therapeutic Exercise  Other Recommendations: Pt may be seen by PTA as part of the rehabilitation team.      Therapist: Vicki Condon PTA  08/16/2018

## 2018-08-20 ENCOUNTER — CLINICAL SUPPORT (OUTPATIENT)
Dept: REHABILITATION | Facility: HOSPITAL | Age: 48
End: 2018-08-20
Attending: FAMILY MEDICINE
Payer: COMMERCIAL

## 2018-08-20 DIAGNOSIS — G62.9 NEUROPATHY: Primary | ICD-10-CM

## 2018-08-20 DIAGNOSIS — M99.01 CERVICAL SEGMENT DYSFUNCTION: ICD-10-CM

## 2018-08-20 DIAGNOSIS — R29.898 TIGHTNESS OF NECK: ICD-10-CM

## 2018-08-20 DIAGNOSIS — R29.898 DECREASED RANGE OF MOTION OF NECK: ICD-10-CM

## 2018-08-20 PROCEDURE — 97110 THERAPEUTIC EXERCISES: CPT | Mod: PN | Performed by: PHYSICAL THERAPIST

## 2018-08-20 NOTE — PROGRESS NOTES
"                                          Physical Therapy Daily Note           Name: Uday Olea  Clinic Number: [id number]  Date of Treatment: 08/20/2018   Diagnosis:   Encounter Diagnoses   Name Primary?    Neuropathy Yes    Cervical segment dysfunction     Tightness of neck     Decreased range of motion of neck        Time in: 905  Time Out:1000  Total Treatment Time: 55 minutes     VISITS / GILL: 8/20 12/31/2018  BOLD=INDICATES ACTIVITIES PERFORMED.      Subjective  Patient states " he is having only some tightness in the shoulder area. Has not been experiencing any numbness or tingling in the left arm like before.   Pain level - 0/10     Objective    Treatment: Patient  was instructed in and performed therapeutic exercises to develop strength, ROM, flexibility and posture. Patient performed therapeutic exercises consisting of the following      Leg press   Recumbent bike  Upright bike   UE ergometer 8'  Treadmill  10'   Elliptical       THERAPEUTIC EXERCISE 1-1 PT = 55'   SUPINE  -Chin tucks x20  -rotation R/L w/end range OP x15   -lateral SB stretch 10" x 6   -TD, angels , scarecrow x15 patterns x blue  TB  -cervical isometrics 7" x 10   -thoracic stretch with arm raise / neck rotation   - rotation with overhead reach x10   -UT / LS stretch (arm pit sniff )     SIDELYING  -lateral lift x15    PRONE  -neck exten x15    STANDING.  -hi row, tight row, w-pull 10 patterns   - low trap activation with GTB 20x   -Free motion horizontal abd 7.5# x15 = progress to 3x15     SITTING         MANUAL THERAPY: manual distraction 7' at the cervical spine with the Mulligan Belt.   MODALITY  DIRECT EDUCATION:    Patient was issued HEP   Written Home Exercises Reviewed.   Pt demo good understanding of the education provided. Patient demonstrated good return demonstration of activities            Assessment    Completed the routine as noted. He did not report any lingering tightness at the end of the session. Responding " well to all activities. Will /dc at 12 visits if he remains asymptomatic. Progressing with periscapular strengthening.   Medical Necessity is demonstrated by:   difficulty  to participate in daily activities  Pain limits function of effected part for some activities, Requires skilled supervision to complete and progress treatment interventions and HEP.  Pt demo good understanding of the education provided. Patient demonstrated good return demonstration of activities.Patient's tolerance to treatment was good. Patient will continue to benefit from skilled PTintervention.Patient is making progress towards established goals.  New/Revised goals: no  Continue with established Plan of Care towards PT goals.     FOTO visit #5   CMS Impairment/Limitation/Restriction for FOTO Lumbar Spine Survey  Status Limitation G-Code CMS Severity Modifier  Intake 94% 6%  Predicted 95% 5% Goal Status+ CI - At least 1 percent but less than 20 percent  8/10/2018 94% 6% Current Status CI - At least 1 percent but less than 20 percent  D/C Status CI **only report if this is discharge survey  +Based on FOTO predicted change score  * Mean, Risk Adjusted, Intake Composite FS measures from FOTO aggregate database.  ** As indicated by the ICF assignments to the survey items in the FOTO survey used.  [facility] [place]  FUNCTIONAL STATUS SUMMARY (7/18/2018)  Patient: ALEX CRAWLEY ([id number]) Primary Body Part: Lumbar Spine Initial DOS: 7/18/2018  Produced and    PLAN     Certification Period: 6/18/2018 to 9/18/2018  Recommended Treatment Plan: 2 times per week for 12 weeks: Manual Therapy, Patient Education and Therapeutic Exercise  Other Recommendations:    Pt may be seen by PTA as part of the rehabilitation team.      Student Physical Therapist: Karissa Barreto  Therapist: Jose Miguel Luis, PT  I certify that I was present in the room directing the student in service delivery and guiding them using my  skilled judgment. As the co-signing therapist I have reviewed the students documentation and am responsible for the treatment, assessment, and plan.

## 2018-08-22 ENCOUNTER — CLINICAL SUPPORT (OUTPATIENT)
Dept: REHABILITATION | Facility: HOSPITAL | Age: 48
End: 2018-08-22
Attending: FAMILY MEDICINE
Payer: COMMERCIAL

## 2018-08-22 DIAGNOSIS — G62.9 NEUROPATHY: Primary | ICD-10-CM

## 2018-08-22 DIAGNOSIS — R29.898 DECREASED RANGE OF MOTION OF NECK: ICD-10-CM

## 2018-08-22 DIAGNOSIS — R29.898 TIGHTNESS OF NECK: ICD-10-CM

## 2018-08-22 DIAGNOSIS — M99.01 CERVICAL SEGMENT DYSFUNCTION: ICD-10-CM

## 2018-08-22 PROCEDURE — 97110 THERAPEUTIC EXERCISES: CPT | Mod: PN

## 2018-08-22 NOTE — PROGRESS NOTES
"                                          Physical Therapy Daily Note           Name: Uday Olea  Clinic Number: [id number]  Date of Treatment: 08/22/2018   Diagnosis:   Encounter Diagnoses   Name Primary?    Neuropathy Yes    Cervical segment dysfunction     Tightness of neck     Decreased range of motion of neck        Time in: 0957  Time Out:1057  Total Treatment Time: 60  minutes     VISITS / GILL: 9/20 12/31/2018  BOLD=INDICATES ACTIVITIES PERFORMED.      Subjective  Patient states that he continues to have the same tightness in his shoulder area.    Pain level - 0/10     Objective    Treatment: Patient  was instructed in and performed therapeutic exercises to develop strength, ROM, flexibility and posture. Patient performed therapeutic exercises consisting of the following      Leg press   Recumbent bike  Upright bike   UE ergometer 8'  Treadmill  10'   Elliptical       THERAPEUTIC EXERCISE 1-1 PT = 55'   SUPINE  -Chin tucks x20  -rotation R/L w/end range OP x15   -lateral SB stretch 10" x 6   -TD, angels , scarecrow x15 patterns x blue  TB  -cervical isometrics 7" x 10   -thoracic stretch with arm raise / neck rotation   - rotation with overhead reach x10   -UT / LS stretch (arm pit sniff )     SIDELYING  -lateral lift x15    PRONE  -neck exten x15    STANDING.  -hi row, tight row, w-pull 10 patterns   - low trap activation with Green [id number]   -Free motion horizontal abd 7.5#  3x15     SITTING         MANUAL THERAPY: manual distraction 0' at the cervical spine with the Mulligan Belt.   MODALITY  DIRECT EDUCATION:    Patient was not  issued HEP   Written Home Exercises Reviewed.   Pt demo good understanding of the education provided. Patient demonstrated good return demonstration of activities            Assessment    Pt tolerated treatment session well today.  Patient with good tolerance to exercises with appropriate training effect achieved.  Patient with decrease in reported stiffness post treatment " session.   Medical Necessity is demonstrated by:   difficulty  to participate in daily activities  Pain limits function of effected part for some activities, Requires skilled supervision to complete and progress treatment interventions and HEP.  Pt demo good understanding of the education provided. Patient demonstrated good return demonstration of activities.Patient's tolerance to treatment was good. Patient will continue to benefit from skilled PTintervention.Patient is making progress towards established goals.  New/Revised goals: no  Continue with established Plan of Care towards PT goals.     FOTO visit #5   CMS Impairment/Limitation/Restriction for FOTO Lumbar Spine Survey  Status Limitation G-Code CMS Severity Modifier  Intake 94% 6%  Predicted 95% 5% Goal Status+ CI - At least 1 percent but less than 20 percent  8/10/2018 94% 6% Current Status CI - At least 1 percent but less than 20 percent  D/C Status CI **only report if this is discharge survey  +Based on FOTO predicted change score  * Mean, Risk Adjusted, Intake Composite FS measures from FOTO aggregate database.  ** As indicated by the ICF assignments to the survey items in the FOTO survey used.  [facility] - [facility] [place]  FUNCTIONAL STATUS SUMMARY (7/18/2018)  Patient: ALEX CRAWLEY ([id number]) Primary Body Part: Lumbar Spine Initial DOS: 7/18/2018  Produced and    PLAN     Certification Period: 6/18/2018 to 9/18/2018  Recommended Treatment Plan: 2 times per week for 12 weeks: Manual Therapy, Patient Education and Therapeutic Exercise  Other Recommendations:    Pt may be seen by PTA as part of the rehabilitation team.        Ernestina Caldwell PTA

## 2018-08-27 ENCOUNTER — CLINICAL SUPPORT (OUTPATIENT)
Dept: REHABILITATION | Facility: HOSPITAL | Age: 48
End: 2018-08-27
Attending: FAMILY MEDICINE
Payer: COMMERCIAL

## 2018-08-27 DIAGNOSIS — G62.9 NEUROPATHY: Primary | ICD-10-CM

## 2018-08-27 DIAGNOSIS — M99.01 CERVICAL SEGMENT DYSFUNCTION: ICD-10-CM

## 2018-08-27 DIAGNOSIS — R29.898 DECREASED RANGE OF MOTION OF NECK: ICD-10-CM

## 2018-08-27 DIAGNOSIS — R29.898 TIGHTNESS OF NECK: ICD-10-CM

## 2018-08-27 PROCEDURE — 97110 THERAPEUTIC EXERCISES: CPT | Mod: PN | Performed by: PHYSICAL THERAPIST

## 2018-08-27 NOTE — PROGRESS NOTES
"                                          Physical Therapy Daily Note           Name: Uday Olea  Clinic Number: [id number]  Date of Treatment: 08/27/2018   Diagnosis:   Encounter Diagnoses   Name Primary?    Neuropathy Yes    Cervical segment dysfunction     Tightness of neck     Decreased range of motion of neck        Time in: 910  Time Out:1015  Total Treatment Time: 65 minutes     VISITS / GILL: 10/20  12/31/2018  BOLD=INDICATES ACTIVITIES PERFORMED.      Subjective  Patient states " he is not experiencing any pain at this time. He says there is only some tightness but not bad.     Pain level - 0/10     Objective    Treatment: Patient  was instructed in and performed therapeutic exercises to develop strength, ROM, flexibility and posture. Patient performed therapeutic exercises consisting of the following      Leg press   Recumbent bike  Upright bike   UE ergometer 8'  Treadmill  10'   Elliptical       THERAPEUTIC EXERCISE 1-1 PT = 65 min  SUPINE  -Chin tucks x20  -rotation R/L w/end range OP x15   -lateral SB stretch 10" x 6   -TD, angels , scarecrow x15 patterns x blue  TB  -cervical isometrics 7" x 10   -thoracic stretch with arm raise / neck rotation   - rotation with overhead reach x10   -UT / LS stretch (arm pit sniff )     SIDELYING  -lateral lift x15    PRONE  -neck exten x15    STANDING.  -hi row, tight row, w-pull 10 patterns   - low trap activation with GTB 20x   -Free motion horizontal abd 7.5# x15 = progress to 3x15     SITTING  -Chin tucks x20  -rotation R/L w/end range OP x15   -lateral SB stretch 10" x 6   -TD, angels , scarecrow x15 patterns x blue  TB  -cervical isometrics 7" x 10   -thoracic stretch with arm raise / neck rotation   - rotation with overhead reach x10   -UT / LS stretch (arm pit sniff )            MANUAL THERAPY: manual distraction 7' at the cervical spine with the Mulligan Belt.   MODALITY  DIRECT EDUCATION:    Patient was issued HEP   Written Home Exercises Reviewed. "   Pt demo good understanding of the education provided. Patient demonstrated good return demonstration of activities            Assessment    The patient  Completed all activities w/o limitations. He has progressed well and is symptom free at this time. Ready for DC at 12 sessions.   Medical Necessity is demonstrated by:   difficulty  to participate in daily activities  Pain limits function of effected part for some activities, Requires skilled supervision to complete and progress treatment interventions and HEP.  Pt demo good understanding of the education provided. Patient demonstrated good return demonstration of activities.Patient's tolerance to treatment was good. Patient will continue to benefit from skilled PTintervention.Patient is making progress towards established goals.  New/Revised goals: no  Continue with established Plan of Care towards PT goals.     FOTO visit #10    CMS Impairment/Limitation/Restriction for FOTO Lumbar Spine Survey  Status Limitation G-Code CMS Severity Modifier  Intake 94% 6%  Predicted 95% 5% Goal Status+ CI - At least 1 percent but less than 20 percent  8/10/2018 94% 6%  8/27/2018 94% 6% Current Status CI - At least 1 percent but less than 20 percent  D/C Status CI **only report if this is discharge survey  [facility] - [facility] [place]  FUNCTIONAL STATUS SUMMARY (7/18/2018)  Patient: ALEX CRAWLEY ([id number]) Primary Body Part: Lumbar Spine Initial DOS: 7/18/2018  Produced and © [id number] by  [employer]    PLAN     Certification Period: 6/18/2018 to 9/18/2018  Recommended Treatment Plan: 2 times per week for 12 weeks: Manual Therapy, Patient Education and Therapeutic Exercise  Other Recommendations:    Pt may be seen by PTA as part of the rehabilitation team.        Therapist: Jose Miguel Luis, PT

## 2018-08-29 ENCOUNTER — CLINICAL SUPPORT (OUTPATIENT)
Dept: REHABILITATION | Facility: HOSPITAL | Age: 48
End: 2018-08-29
Attending: FAMILY MEDICINE
Payer: COMMERCIAL

## 2018-08-29 DIAGNOSIS — G62.9 NEUROPATHY: Primary | ICD-10-CM

## 2018-08-29 DIAGNOSIS — R29.898 TIGHTNESS OF NECK: ICD-10-CM

## 2018-08-29 DIAGNOSIS — R29.898 DECREASED RANGE OF MOTION OF NECK: ICD-10-CM

## 2018-08-29 DIAGNOSIS — M99.01 CERVICAL SEGMENT DYSFUNCTION: ICD-10-CM

## 2018-08-29 PROCEDURE — 97110 THERAPEUTIC EXERCISES: CPT | Mod: PN | Performed by: PHYSICAL THERAPIST

## 2018-08-29 NOTE — PROGRESS NOTES
"                                          Physical Therapy Daily Note           Name: Uday Olea  Clinic Number: [id number]  Date of Treatment: 08/29/2018   Diagnosis:   Encounter Diagnoses   Name Primary?    Neuropathy Yes    Cervical segment dysfunction     Tightness of neck     Decreased range of motion of neck        Time in: 910  Time Out:1010  Total Treatment Time: 60  minutes     VISITS / GILL: 11/20 12/31/2018  BOLD=INDICATES ACTIVITIES PERFORMED.      Subjective  Patient states " the neck and arm are feeling fine. No c/o     Pain level - 0/10     Objective    Treatment: Patient  was instructed in and performed therapeutic exercises to develop strength, ROM, flexibility and posture. Patient performed therapeutic exercises consisting of the following      Leg press   Recumbent bike  Upright bike   UE ergometer 8'  Treadmill  10'   Elliptical       THERAPEUTIC EXERCISE 1-1 PT = 60 min  SUPINE  -Chin tucks x20  -rotation R/L w/end range OP x15   -lateral SB stretch 10" x 6     -cervical isometrics 7" x 10   -thoracic stretch with arm raise / neck rotation   - rotation with overhead reach x10   -UT / LS stretch (arm pit sniff )     SIDELYING  -lateral lift x15    PRONE  -neck exten x15    STANDING.  -hi row, tight row, w-pull 10 patterns   - low trap activation with GTB 20x   -Free motion horizontal abd 7.5# x15 = progress to 3x15     SITTING  -Chin tucks x20  -rotation R/L w/end range OP x15   -lateral SB stretch 10" x 6   -TD, angels , scarecrow x15 patterns x blue  TB  -cervical isometrics 7" x 10   -TD, angels , scarecrow x15 patterns x blue  TB  - rotation with overhead reach x10   -UT / LS stretch (arm pit sniff )            MANUAL THERAPY: manual distraction 7' at the cervical spine with the Mulligan Belt.   MODALITY  DIRECT EDUCATION:    Patient was issued HEP   Written Home Exercises Reviewed.   Pt demo good understanding of the education provided. Patient demonstrated good return demonstration " of activities            Assessment    The patient is not voicing any c/o at this time. He has remained asymptomatic and will be ready for DC at the next session.   Medical Necessity is demonstrated by:   difficulty  to participate in daily activities  Pain limits function of effected part for some activities, Requires skilled supervision to complete and progress treatment interventions and HEP.  Pt demo good understanding of the education provided. Patient demonstrated good return demonstration of activities.Patient's tolerance to treatment was good. Patient will continue to benefit from skilled PTintervention.Patient is making progress towards established goals.  New/Revised goals: no  Continue with established Plan of Care towards PT goals.     FOTO visit #10    CMS Impairment/Limitation/Restriction for FOTO Lumbar Spine Survey  Status Limitation G-Code CMS Severity Modifier  Intake 94% 6%  Predicted 95% 5% Goal Status+ CI - At least 1 percent but less than 20 percent  8/10/2018 94% 6%  8/27/2018 94% 6% Current Status CI - At least 1 percent but less than 20 percent  D/C Status CI **only report if this is discharge survey  [facility] [place]  FUNCTIONAL STATUS SUMMARY (7/18/2018)  Patient: ALEX CRAWLEY ([id number]) Primary Body Part: Lumbar Spine Initial DOS: 7/18/2018  Produced and © [id number] by  [employer]    PLAN     Certification Period: 6/18/2018 to 9/18/2018  Recommended Treatment Plan: 2 times per week for 12 weeks: Manual Therapy, Patient Education and Therapeutic Exercise  Other Recommendations:    Pt may be seen by PTA as part of the rehabilitation team.        Therapist: Jose Miguel Luis, PT

## 2018-09-14 ENCOUNTER — CLINICAL SUPPORT (OUTPATIENT)
Dept: REHABILITATION | Facility: HOSPITAL | Age: 48
End: 2018-09-14
Attending: FAMILY MEDICINE
Payer: COMMERCIAL

## 2018-09-14 DIAGNOSIS — M99.01 CERVICAL SEGMENT DYSFUNCTION: ICD-10-CM

## 2018-09-14 DIAGNOSIS — R29.898 DECREASED RANGE OF MOTION OF NECK: ICD-10-CM

## 2018-09-14 DIAGNOSIS — R29.898 TIGHTNESS OF NECK: ICD-10-CM

## 2018-09-14 DIAGNOSIS — G62.9 NEUROPATHY: Primary | ICD-10-CM

## 2018-09-14 PROCEDURE — 97110 THERAPEUTIC EXERCISES: CPT | Mod: PN | Performed by: PHYSICAL THERAPIST

## 2018-09-14 PROCEDURE — 97140 MANUAL THERAPY 1/> REGIONS: CPT | Mod: PN | Performed by: PHYSICAL THERAPIST

## 2018-09-14 NOTE — PROGRESS NOTES
"                                          Physical Therapy Daily Note           Name: Uday Olea  Clinic Number: [id number]  Date of Treatment: 09/14/2018   Diagnosis:   Encounter Diagnoses   Name Primary?    Neuropathy Yes    Cervical segment dysfunction     Tightness of neck     Decreased range of motion of neck      DISCHARGE NOTE  Physician:  Jigna Draper MD   Original Orders : PT eval and treat  Initial visit: 7/20/2018  Date of Last visit: 9/14/2018  Date of Discharge Note:  9/14/2018  Total Visits Received: 12    Time in: 1215  Time Out:1330  Total Treatment Time: 75 minutes     VISITS / GILL: 12/20 12/31/2018  BOLD=INDICATES ACTIVITIES PERFORMED.      Subjective  Patient states "there is no pain and the neck is mainly stiff on the left side.     Pain level - 0/10     Objective    Cervical/Thoracic AROM: Pain/Dysfunction with Movement:   Flexion                               75 FN   Extension                           80 FN   Right side bending                     50 No pain    Left side bending                        50 No pain   Right rotation FN   Left rotation FN       Special Tests:    Left Right   Spurling Pos   Neg               Treatment: Patient  was instructed in and performed therapeutic exercises to develop strength, ROM, flexibility and posture. Patient performed therapeutic exercises consisting of the following      Leg press   Recumbent bike  Upright bike   UE ergometer 8'  Treadmill  10'   Elliptical       THERAPEUTIC EXERCISE 1-1 PT = 60 min  SUPINE  -Chin tucks x20  -rotation R/L w/end range OP x15   -lateral SB stretch 10" x 6     -cervical isometrics 7" x 10   -thoracic stretch with arm raise / neck rotation   - rotation with overhead reach x10   -UT / LS stretch (arm pit sniff )     SIDELYING  -lateral lift x15    PRONE  -neck exten x15    STANDING.  -hi row, tight row, w-pull 10 patterns   - low trap activation with GTB 20x   -Free motion horizontal abd 7.5# x15 = progress " "to 3x15     SITTING  -Chin tucks x20  -rotation R/L w/end range OP x15   -lateral SB stretch 10" x 6   -TD, angels , scarecrow x15 patterns x blue  TB  -cervical isometrics 7" x 10   -TD, angels , scarecrow x15 patterns x blue  TB  - rotation with overhead reach x10   -UT / LS stretch (arm pit sniff )            MANUAL THERAPY: KEILA to facilitate left rotation resisting RR and to increase left rotation right rotation by resisting LLF/LR/Ext. LAHVTM into rotation bilaterally with cavitation achieve in each direction of rotation.  1-1 PT = 15'       MODALITY  DIRECT EDUCATION:    Patient was issued HEP   Written Home Exercises Reviewed.   Pt demo good understanding of the education provided. Patient demonstrated good return demonstration of activities       Assessment      Discharge reason : Met goals and Patient has maximized benefit from PT at this time  Goals Achieved: yes    Short Term GOALS: 6 weeks. Pt agrees with goals set. MET  1. Decrease paresthesia episodes  20-40%  2. Increase mobility in the low cervical segments in extension   3. Reduce tightness in the anterior chest and suprascapular musculature   4. Patient demonstrates correct exercise technique and return demonstration for progression to HEP        Long Term GOALS: 12 weeks. Pt agrees with goals set.  MET  1. Patient demonstrates increased scapulothoracic strength and endurance to improve tolerance to functional activities.   2.Patient demonstrates independence with Postural Awareness and correction   3. Patient demonstrates independence with body mechanics.   4. Restore normal cervical mobility and eliminate paresthesia L      FOTO visit #12  CMS Impairment/Limitation/Restriction for FOTO Lumbar Spine Survey  Status Limitation G-Code CMS Severity Modifier  Intake 94% 6%  Predicted 95% 5% Goal Status+ CI - At least 1 percent but less than 20 percent  8/10/2018 94% 6%  8/27/2018 94% 6%  9/14/2018 94% 6% - D/C Status CI       At least 1 percent but " less than 20 percent  **only report if this is discharge survey  +Based on FOTO predicted change score  * Mean, Risk Adjusted, Intake Composite FS measures from FOTO aggregate database.  ** As indicated by the ICF assignments to the survey items in the FOTO survey used.  [facility] - [facility] [place]  FUNCTIONAL STATUS SUMMARY (7/18/2018)  Patient: ALEX CRAWLEY ([id number]) Primary Body Part: Lumbar Spine Initial DOS: 7/18/2018  Produced and © [id number] by  [employer]      PLAN     Certification Period: 6/18/2018 to 9/18/2018  Recommended Treatment Plan: 2 times per week for 12 weeks: Manual Therapy, Patient Education and Therapeutic Exercise  Other Recommendations:    Pt may be seen by PTA as part of the rehabilitation team.        Therapist: Jose Miguel Luis, PT

## 2018-10-06 ENCOUNTER — TELEPHONE (OUTPATIENT)
Dept: ADMINISTRATIVE | Facility: HOSPITAL | Age: 48
End: 2018-10-06

## 2019-09-20 ENCOUNTER — PATIENT MESSAGE (OUTPATIENT)
Dept: ADMINISTRATIVE | Facility: HOSPITAL | Age: 49
End: 2019-09-20